# Patient Record
Sex: MALE | Race: WHITE | URBAN - METROPOLITAN AREA
[De-identification: names, ages, dates, MRNs, and addresses within clinical notes are randomized per-mention and may not be internally consistent; named-entity substitution may affect disease eponyms.]

---

## 2018-04-13 ENCOUNTER — HISTORICAL (OUTPATIENT)
Dept: ADMINISTRATIVE | Facility: HOSPITAL | Age: 58
End: 2018-04-13

## 2018-04-30 ENCOUNTER — HOSPITAL ENCOUNTER (OUTPATIENT)
Dept: NUTRITION | Facility: HOSPITAL | Age: 58
End: 2018-05-01
Attending: SURGERY | Admitting: SURGERY

## 2018-04-30 LAB
ABS NEUT (OLG): 6.12 X10(3)/MCL (ref 2.1–9.2)
ALBUMIN SERPL-MCNC: 3.5 GM/DL (ref 3.4–5)
ALBUMIN/GLOB SERPL: 0.9 RATIO (ref 1.1–2)
ALP SERPL-CCNC: 122 UNIT/L (ref 50–136)
ALT SERPL-CCNC: 25 UNIT/L (ref 12–78)
AMPHET UR QL SCN: ABNORMAL
APPEARANCE, UA: CLEAR
APTT PPP: 30.3 SECOND(S) (ref 24.8–36.9)
AST SERPL-CCNC: 44 UNIT/L (ref 15–37)
BACTERIA SPEC CULT: NORMAL /HPF
BARBITURATE SCN PRESENT UR: ABNORMAL
BASOPHILS # BLD AUTO: 0 X10(3)/MCL (ref 0–0.2)
BASOPHILS NFR BLD AUTO: 0 %
BENZODIAZ UR QL SCN: POSITIVE
BILIRUB SERPL-MCNC: 0.4 MG/DL (ref 0.2–1)
BILIRUB UR QL STRIP: NEGATIVE
BILIRUBIN DIRECT+TOT PNL SERPL-MCNC: 0.1 MG/DL (ref 0–0.5)
BILIRUBIN DIRECT+TOT PNL SERPL-MCNC: 0.3 MG/DL (ref 0–0.8)
BUN SERPL-MCNC: 14 MG/DL (ref 7–18)
CALCIUM SERPL-MCNC: 8.9 MG/DL (ref 8.5–10.1)
CANNABINOIDS UR QL SCN: ABNORMAL
CHLORIDE SERPL-SCNC: 114 MMOL/L (ref 98–107)
CO2 SERPL-SCNC: 25 MMOL/L (ref 21–32)
COCAINE UR QL SCN: ABNORMAL
COLOR UR: YELLOW
CREAT SERPL-MCNC: 0.86 MG/DL (ref 0.7–1.3)
EOSINOPHIL # BLD AUTO: 0.4 X10(3)/MCL (ref 0–0.9)
EOSINOPHIL NFR BLD AUTO: 4 %
ERYTHROCYTE [DISTWIDTH] IN BLOOD BY AUTOMATED COUNT: 15.1 % (ref 11.5–17)
ETHANOL SERPL-MCNC: 215 MG/DL (ref 0–3)
GLOBULIN SER-MCNC: 4 GM/DL (ref 2.4–3.5)
GLUCOSE (UA): NEGATIVE
GLUCOSE SERPL-MCNC: 82 MG/DL (ref 74–106)
HCT VFR BLD AUTO: 41 % (ref 42–52)
HGB BLD-MCNC: 12.9 GM/DL (ref 14–18)
HGB UR QL STRIP: NEGATIVE
INR PPP: 0.85 (ref 0–1.27)
KETONES UR QL STRIP: NEGATIVE
LACTATE SERPL-SCNC: 2 MMOL/L (ref 0.4–2)
LACTATE SERPL-SCNC: 3 MMOL/L (ref 0.4–2)
LEUKOCYTE ESTERASE UR QL STRIP: NEGATIVE
LYMPHOCYTES # BLD AUTO: 4.2 X10(3)/MCL (ref 0.6–4.6)
LYMPHOCYTES NFR BLD AUTO: 36 %
MCH RBC QN AUTO: 29.7 PG (ref 27–31)
MCHC RBC AUTO-ENTMCNC: 31.5 GM/DL (ref 33–36)
MCV RBC AUTO: 94.3 FL (ref 80–94)
MONOCYTES # BLD AUTO: 0.8 X10(3)/MCL (ref 0.1–1.3)
MONOCYTES NFR BLD AUTO: 6 %
NEUTROPHILS # BLD AUTO: 6.12 X10(3)/MCL (ref 1.4–7.9)
NEUTROPHILS NFR BLD AUTO: 53 %
NITRITE UR QL STRIP: NEGATIVE
OPIATES UR QL SCN: ABNORMAL
PCP UR QL: ABNORMAL
PH UR STRIP.AUTO: 7 [PH] (ref 5–7.5)
PH UR STRIP: 7 [PH] (ref 5–9)
PLATELET # BLD AUTO: 239 X10(3)/MCL (ref 130–400)
PMV BLD AUTO: 10.2 FL (ref 9.4–12.4)
POTASSIUM SERPL-SCNC: 3.5 MMOL/L (ref 3.5–5.1)
PROT SERPL-MCNC: 7.5 GM/DL (ref 6.4–8.2)
PROT UR QL STRIP: NEGATIVE
PROTHROMBIN TIME: 11.9 SECOND(S) (ref 12.2–14.7)
RBC # BLD AUTO: 4.35 X10(6)/MCL (ref 4.7–6.1)
RBC #/AREA URNS HPF: NORMAL /[HPF]
SODIUM SERPL-SCNC: 146 MMOL/L (ref 136–145)
SP GR FLD REFRACTOMETRY: 1.01 (ref 1–1.03)
SP GR UR STRIP: 1.01 (ref 1–1.03)
SQUAMOUS EPITHELIAL, UA: NORMAL
UROBILINOGEN UR STRIP-ACNC: 1
WBC # SPEC AUTO: 11.6 X10(3)/MCL (ref 4.5–11.5)
WBC #/AREA URNS HPF: NORMAL /HPF

## 2018-07-18 ENCOUNTER — HISTORICAL (OUTPATIENT)
Dept: ADMINISTRATIVE | Facility: HOSPITAL | Age: 58
End: 2018-07-18

## 2018-12-14 ENCOUNTER — HISTORICAL (OUTPATIENT)
Dept: ADMINISTRATIVE | Facility: HOSPITAL | Age: 58
End: 2018-12-14

## 2019-01-18 ENCOUNTER — HISTORICAL (OUTPATIENT)
Dept: RADIOLOGY | Facility: HOSPITAL | Age: 59
End: 2019-01-18

## 2019-02-13 ENCOUNTER — HISTORICAL (OUTPATIENT)
Dept: ADMINISTRATIVE | Facility: HOSPITAL | Age: 59
End: 2019-02-13

## 2019-02-18 ENCOUNTER — HISTORICAL (OUTPATIENT)
Dept: ADMINISTRATIVE | Facility: HOSPITAL | Age: 59
End: 2019-02-18

## 2019-02-18 LAB
ALBUMIN SERPL-MCNC: 3.9 GM/DL (ref 3.4–5)
ALBUMIN/GLOB SERPL: 0.9 RATIO (ref 1.1–2)
ALP SERPL-CCNC: 95 UNIT/L (ref 45–117)
ALT SERPL-CCNC: 16 UNIT/L (ref 12–78)
AST SERPL-CCNC: 30 UNIT/L (ref 15–37)
BILIRUB SERPL-MCNC: 0.3 MG/DL (ref 0.2–1)
BILIRUBIN DIRECT+TOT PNL SERPL-MCNC: <0.1 MG/DL
BILIRUBIN DIRECT+TOT PNL SERPL-MCNC: ABNORMAL MG/DL
BUN SERPL-MCNC: 20 MG/DL (ref 7–18)
CALCIUM SERPL-MCNC: 8 MG/DL (ref 8.5–10.1)
CHLORIDE SERPL-SCNC: 105 MMOL/L (ref 98–107)
CO2 SERPL-SCNC: 28 MMOL/L (ref 21–32)
CREAT SERPL-MCNC: 1 MG/DL (ref 0.6–1.3)
ERYTHROCYTE [DISTWIDTH] IN BLOOD BY AUTOMATED COUNT: 20 % (ref 11.5–14.5)
GLOBULIN SER-MCNC: 4.2 GM/ML (ref 2.3–3.5)
GLUCOSE SERPL-MCNC: 131 MG/DL (ref 74–106)
HCT VFR BLD AUTO: 35.7 % (ref 40–51)
HGB BLD-MCNC: 11 GM/DL (ref 13.5–17.5)
MCH RBC QN AUTO: 26.4 PG (ref 26–34)
MCHC RBC AUTO-ENTMCNC: 30.8 GM/DL (ref 31–37)
MCV RBC AUTO: 85.8 FL (ref 80–100)
PLATELET # BLD AUTO: 393 X10(3)/MCL (ref 130–400)
PMV BLD AUTO: 10.1 FL (ref 7.4–10.4)
POTASSIUM SERPL-SCNC: 3.4 MMOL/L (ref 3.5–5.1)
PROT SERPL-MCNC: 8.1 GM/DL (ref 6.4–8.2)
RBC # BLD AUTO: 4.16 X10(6)/MCL (ref 4.5–5.9)
SODIUM SERPL-SCNC: 140 MMOL/L (ref 136–145)
T4 FREE SERPL-MCNC: 0.92 NG/DL (ref 0.76–1.46)
TSH SERPL-ACNC: 1.3 MIU/L (ref 0.36–3.74)
WBC # SPEC AUTO: 9.4 X10(3)/MCL (ref 4.5–11)

## 2019-02-21 ENCOUNTER — HISTORICAL (OUTPATIENT)
Dept: SURGERY | Facility: HOSPITAL | Age: 59
End: 2019-02-21

## 2019-02-21 LAB — ETHANOL SERPL-MCNC: <3 MG/DL

## 2019-03-20 ENCOUNTER — HISTORICAL (OUTPATIENT)
Dept: ADMINISTRATIVE | Facility: HOSPITAL | Age: 59
End: 2019-03-20

## 2022-04-10 ENCOUNTER — HISTORICAL (OUTPATIENT)
Dept: ADMINISTRATIVE | Facility: HOSPITAL | Age: 62
End: 2022-04-10

## 2022-04-11 ENCOUNTER — HISTORICAL (OUTPATIENT)
Dept: ADMINISTRATIVE | Facility: HOSPITAL | Age: 62
End: 2022-04-11

## 2022-04-28 VITALS
SYSTOLIC BLOOD PRESSURE: 159 MMHG | DIASTOLIC BLOOD PRESSURE: 76 MMHG | HEIGHT: 71 IN | BODY MASS INDEX: 27.66 KG/M2 | WEIGHT: 197.56 LBS
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
## 2022-04-30 NOTE — OP NOTE
Patient:   Akbar Hboson             MRN: [id number]            FIN: [id number]               Age:   58 years     Sex:  Male     :  1960   Associated Diagnoses:   None   Author:   Reji Bolton MD      Operative Note:    Date: 19    Surgeon: Reji Bolton MD    Staff Surgeon: Jc Dubose MD    Preoperative Diagnosis: Left ring finger chronic PIP dislocation    Postoperative Diagnosis: same     Procedure: Open reduction internal fixation left ring finger    Blood Loss: 15ml    Complications: None    Implants: One 0.045 K wire    Procedure in Detail: The patient was seen and marked in the preoperative area. Consents were obtained. The patient was taken to the operating room and moved to the operative bed. The extremity was prepped in the usual sterile fashion. A timeout was performed. Tourniquet was inflated.     Improvement incision was made over the volar aspect of the ring finger as well as a mid axial incision on the ulnar side which connect to the Audra incision.  The skin flap was raised radially.  The neurovascular bundle was identified and care was taken to protect it.  We dissected down to the tendon sheath and elevated the skin flap radially.  We identified the radial neurovascular bundle and protected it.  We made an incision over the tendon sheath between the A2 and A4 pulleys and a flap of the tendon sheath was created and retracted radially.  We then retracted the tendons radially and identified the volar plate which was buttonholed through by the head of the proximal phalanx.  We incised the volar plate as well as the collateral ligaments on each side of the phalanx.  We then exposed the dorsal surface of the head of the proximal phalanx but we were unable to free the middle phalanx from the proximal phalanx since there was a significant bony bridge on the dorsal aspect of the proximal phalanx.  We used a freer and periosteal elevator to break up the bone on the dorsal aspect of the  proximal phalanx in order to free the middle phalanx.  We then reduced the middle phalanx onto the proximal phalanx.  We then noticed that the middle phalanx was still contracted on dorsal side.  We then created a shotgun approach to the articular surface of the middle phalanx.  After creating shotgun approach, the inserted a .045 K wire in an antegrade fashion through the center of the middle phalanx and out of the skin on the ulnar side of the digit.  We then weaved the flap of the tendon sheath dorsally behind the tendons and sewed it to the volar plate.  The volar plate was disrupted chronically and was unable to be repaired that we sewed the tendon sheath to the volar plate remnant with 6-0 nylon suture to create a film to reduce adhesions to the bone.  We then advanced our K wire in a retrograde fashion through the head of the proximal phalanx.  We confirmed our reduction on fluoroscopy.  We then irrigated the wound and let the tourniquet down.  We held pressure for 5 minutes to obtain hemostasis.  The wound was closed with 4-0 nylon suture.    The pin was cut and a pin cap was placed.  A sterile dressing with Xeroform 4 x 4 cast padding and a dorsal blocking splint was placed.  The patient was awoke wakened from anesthesia and taken to PACU in stable condition.  All sponge and instrument counts are correct      Postoperative Plan:  The patient will come to clinic in 2 weeks for wound check.  At that time we will take him out of the splint.  We will encourage range of motion at that time but we will leave the pins in for 4-6 weeks
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
